# Patient Record
Sex: MALE | Race: WHITE | NOT HISPANIC OR LATINO | Employment: FULL TIME | URBAN - METROPOLITAN AREA
[De-identification: names, ages, dates, MRNs, and addresses within clinical notes are randomized per-mention and may not be internally consistent; named-entity substitution may affect disease eponyms.]

---

## 2017-10-31 ENCOUNTER — RECORDS - HEALTHEAST (OUTPATIENT)
Dept: LAB | Facility: CLINIC | Age: 52
End: 2017-10-31

## 2017-10-31 LAB
CHOLEST SERPL-MCNC: 188 MG/DL
FASTING STATUS PATIENT QL REPORTED: YES
HDLC SERPL-MCNC: 38 MG/DL
LDLC SERPL CALC-MCNC: 117 MG/DL
TRIGL SERPL-MCNC: 164 MG/DL

## 2017-12-29 ENCOUNTER — RECORDS - HEALTHEAST (OUTPATIENT)
Dept: ADMINISTRATIVE | Facility: OTHER | Age: 52
End: 2017-12-29

## 2018-02-02 ENCOUNTER — RECORDS - HEALTHEAST (OUTPATIENT)
Dept: LAB | Facility: CLINIC | Age: 53
End: 2018-02-02

## 2018-02-02 LAB
ANION GAP SERPL CALCULATED.3IONS-SCNC: 10 MMOL/L (ref 5–18)
BUN SERPL-MCNC: 13 MG/DL (ref 8–22)
CALCIUM SERPL-MCNC: 9.3 MG/DL (ref 8.5–10.5)
CHLORIDE BLD-SCNC: 108 MMOL/L (ref 98–107)
CO2 SERPL-SCNC: 22 MMOL/L (ref 22–31)
CREAT SERPL-MCNC: 0.84 MG/DL (ref 0.7–1.3)
GFR SERPL CREATININE-BSD FRML MDRD: >60 ML/MIN/1.73M2
GLUCOSE BLD-MCNC: 92 MG/DL (ref 70–125)
POTASSIUM BLD-SCNC: 4.3 MMOL/L (ref 3.5–5)
SODIUM SERPL-SCNC: 140 MMOL/L (ref 136–145)

## 2018-02-15 ASSESSMENT — MIFFLIN-ST. JEOR: SCORE: 1951.63

## 2018-02-21 ENCOUNTER — SURGERY - HEALTHEAST (OUTPATIENT)
Dept: SURGERY | Facility: CLINIC | Age: 53
End: 2018-02-21

## 2018-02-21 ENCOUNTER — ANESTHESIA - HEALTHEAST (OUTPATIENT)
Dept: SURGERY | Facility: CLINIC | Age: 53
End: 2018-02-21

## 2018-02-21 ASSESSMENT — MIFFLIN-ST. JEOR: SCORE: 1976.86

## 2018-03-02 ENCOUNTER — RECORDS - HEALTHEAST (OUTPATIENT)
Dept: ADMINISTRATIVE | Facility: OTHER | Age: 53
End: 2018-03-02

## 2018-03-30 ENCOUNTER — RECORDS - HEALTHEAST (OUTPATIENT)
Dept: ADMINISTRATIVE | Facility: OTHER | Age: 53
End: 2018-03-30

## 2018-04-27 ENCOUNTER — RECORDS - HEALTHEAST (OUTPATIENT)
Dept: ADMINISTRATIVE | Facility: OTHER | Age: 53
End: 2018-04-27

## 2018-07-20 ENCOUNTER — RECORDS - HEALTHEAST (OUTPATIENT)
Dept: ADMINISTRATIVE | Facility: OTHER | Age: 53
End: 2018-07-20

## 2018-08-16 ENCOUNTER — RECORDS - HEALTHEAST (OUTPATIENT)
Dept: LAB | Facility: CLINIC | Age: 53
End: 2018-08-16

## 2018-08-16 LAB
ALBUMIN SERPL-MCNC: 4.2 G/DL (ref 3.5–5)
ALP SERPL-CCNC: 62 U/L (ref 45–120)
ALT SERPL W P-5'-P-CCNC: 39 U/L (ref 0–45)
ANION GAP SERPL CALCULATED.3IONS-SCNC: 11 MMOL/L (ref 5–18)
AST SERPL W P-5'-P-CCNC: 30 U/L (ref 0–40)
BILIRUB SERPL-MCNC: 0.9 MG/DL (ref 0–1)
BUN SERPL-MCNC: 19 MG/DL (ref 8–22)
CALCIUM SERPL-MCNC: 9.7 MG/DL (ref 8.5–10.5)
CHLORIDE BLD-SCNC: 107 MMOL/L (ref 98–107)
CO2 SERPL-SCNC: 24 MMOL/L (ref 22–31)
CREAT SERPL-MCNC: 0.93 MG/DL (ref 0.7–1.3)
GFR SERPL CREATININE-BSD FRML MDRD: >60 ML/MIN/1.73M2
GLUCOSE BLD-MCNC: 89 MG/DL (ref 70–125)
POTASSIUM BLD-SCNC: 4.5 MMOL/L (ref 3.5–5)
PROT SERPL-MCNC: 6.7 G/DL (ref 6–8)
SODIUM SERPL-SCNC: 142 MMOL/L (ref 136–145)

## 2018-12-26 ENCOUNTER — RECORDS - HEALTHEAST (OUTPATIENT)
Dept: LAB | Facility: CLINIC | Age: 53
End: 2018-12-26

## 2018-12-26 LAB
ALBUMIN SERPL-MCNC: 3.9 G/DL (ref 3.5–5)
ALP SERPL-CCNC: 71 U/L (ref 45–120)
ALT SERPL W P-5'-P-CCNC: 34 U/L (ref 0–45)
ANION GAP SERPL CALCULATED.3IONS-SCNC: 8 MMOL/L (ref 5–18)
AST SERPL W P-5'-P-CCNC: 22 U/L (ref 0–40)
BILIRUB SERPL-MCNC: 0.7 MG/DL (ref 0–1)
BUN SERPL-MCNC: 18 MG/DL (ref 8–22)
CALCIUM SERPL-MCNC: 9.5 MG/DL (ref 8.5–10.5)
CHLORIDE BLD-SCNC: 106 MMOL/L (ref 98–107)
CHOLEST SERPL-MCNC: 194 MG/DL
CO2 SERPL-SCNC: 26 MMOL/L (ref 22–31)
CREAT SERPL-MCNC: 0.83 MG/DL (ref 0.7–1.3)
FASTING STATUS PATIENT QL REPORTED: YES
GFR SERPL CREATININE-BSD FRML MDRD: >60 ML/MIN/1.73M2
GLUCOSE BLD-MCNC: 100 MG/DL (ref 70–125)
HDLC SERPL-MCNC: 41 MG/DL
LDLC SERPL CALC-MCNC: 88 MG/DL
POTASSIUM BLD-SCNC: 4.1 MMOL/L (ref 3.5–5)
PROT SERPL-MCNC: 6.6 G/DL (ref 6–8)
SODIUM SERPL-SCNC: 140 MMOL/L (ref 136–145)
TRIGL SERPL-MCNC: 327 MG/DL

## 2019-05-31 ENCOUNTER — RECORDS - HEALTHEAST (OUTPATIENT)
Dept: LAB | Facility: CLINIC | Age: 54
End: 2019-05-31

## 2019-06-02 LAB — BACTERIA SPEC CULT: NORMAL

## 2020-01-13 ENCOUNTER — RECORDS - HEALTHEAST (OUTPATIENT)
Dept: LAB | Facility: CLINIC | Age: 55
End: 2020-01-13

## 2020-01-13 LAB
ALBUMIN SERPL-MCNC: 4.1 G/DL (ref 3.5–5)
ALP SERPL-CCNC: 89 U/L (ref 45–120)
ALT SERPL W P-5'-P-CCNC: 44 U/L (ref 0–45)
ANION GAP SERPL CALCULATED.3IONS-SCNC: 10 MMOL/L (ref 5–18)
AST SERPL W P-5'-P-CCNC: 32 U/L (ref 0–40)
BILIRUB SERPL-MCNC: 0.3 MG/DL (ref 0–1)
BUN SERPL-MCNC: 15 MG/DL (ref 8–22)
CALCIUM SERPL-MCNC: 9.7 MG/DL (ref 8.5–10.5)
CHLORIDE BLD-SCNC: 107 MMOL/L (ref 98–107)
CHOLEST SERPL-MCNC: 198 MG/DL
CO2 SERPL-SCNC: 23 MMOL/L (ref 22–31)
CREAT SERPL-MCNC: 0.9 MG/DL (ref 0.7–1.3)
FASTING STATUS PATIENT QL REPORTED: NO
GFR SERPL CREATININE-BSD FRML MDRD: >60 ML/MIN/1.73M2
GLUCOSE BLD-MCNC: 85 MG/DL (ref 70–125)
HDLC SERPL-MCNC: 34 MG/DL
LDLC SERPL CALC-MCNC: 101 MG/DL
LDLC SERPL CALC-MCNC: ABNORMAL MG/DL
POTASSIUM BLD-SCNC: 4.5 MMOL/L (ref 3.5–5)
PROT SERPL-MCNC: 6.7 G/DL (ref 6–8)
SODIUM SERPL-SCNC: 140 MMOL/L (ref 136–145)
TRIGL SERPL-MCNC: 587 MG/DL

## 2020-06-10 ENCOUNTER — RECORDS - HEALTHEAST (OUTPATIENT)
Dept: LAB | Facility: CLINIC | Age: 55
End: 2020-06-10

## 2020-06-10 LAB
ALT SERPL W P-5'-P-CCNC: 40 U/L (ref 0–45)
CHOLEST SERPL-MCNC: 160 MG/DL
FASTING STATUS PATIENT QL REPORTED: NO
HDLC SERPL-MCNC: 34 MG/DL
LDLC SERPL CALC-MCNC: 70 MG/DL
LDLC SERPL CALC-MCNC: ABNORMAL MG/DL
TRIGL SERPL-MCNC: 510 MG/DL

## 2020-06-12 LAB — TESTOST SERPL-MCNC: 295 NG/DL (ref 221–716)

## 2020-12-10 ENCOUNTER — RECORDS - HEALTHEAST (OUTPATIENT)
Dept: LAB | Facility: CLINIC | Age: 55
End: 2020-12-10

## 2020-12-10 LAB
ALBUMIN SERPL-MCNC: 4.1 G/DL (ref 3.5–5)
ALP SERPL-CCNC: 72 U/L (ref 45–120)
ALT SERPL W P-5'-P-CCNC: 58 U/L (ref 0–45)
ANION GAP SERPL CALCULATED.3IONS-SCNC: 7 MMOL/L (ref 5–18)
AST SERPL W P-5'-P-CCNC: 45 U/L (ref 0–40)
BILIRUB SERPL-MCNC: 0.8 MG/DL (ref 0–1)
BUN SERPL-MCNC: 16 MG/DL (ref 8–22)
CALCIUM SERPL-MCNC: 9.3 MG/DL (ref 8.5–10.5)
CHLORIDE BLD-SCNC: 106 MMOL/L (ref 98–107)
CO2 SERPL-SCNC: 27 MMOL/L (ref 22–31)
CREAT SERPL-MCNC: 1.07 MG/DL (ref 0.7–1.3)
GFR SERPL CREATININE-BSD FRML MDRD: >60 ML/MIN/1.73M2
GLUCOSE BLD-MCNC: 105 MG/DL (ref 70–125)
POTASSIUM BLD-SCNC: 4.7 MMOL/L (ref 3.5–5)
PROT SERPL-MCNC: 6.6 G/DL (ref 6–8)
SODIUM SERPL-SCNC: 140 MMOL/L (ref 136–145)

## 2021-03-26 ENCOUNTER — RECORDS - HEALTHEAST (OUTPATIENT)
Dept: LAB | Facility: CLINIC | Age: 56
End: 2021-03-26

## 2021-03-26 LAB
CHOLEST SERPL-MCNC: 166 MG/DL
FASTING STATUS PATIENT QL REPORTED: YES
HDLC SERPL-MCNC: 37 MG/DL
LDLC SERPL CALC-MCNC: 92 MG/DL
TRIGL SERPL-MCNC: 184 MG/DL

## 2021-06-01 VITALS — HEIGHT: 72 IN | BODY MASS INDEX: 33.26 KG/M2 | WEIGHT: 245.56 LBS

## 2021-06-16 NOTE — ANESTHESIA CARE TRANSFER NOTE
Last vitals:   Vitals:    02/21/18 1410   BP: (!) 150/93   Pulse: 77   Resp: 15   Temp:    SpO2: 100%     Patient's level of consciousness is drowsy  Spontaneous respirations: yes  Maintains airway independently: yes  Dentition unchanged: yes  Oropharynx: oropharynx clear of all foreign objects    QCDR Measures:  ASA# 20 - Surgical Safety Checklist: WHO surgical safety checklist completed prior to induction  PQRS# 430 - Adult PONV Prevention: 4558F - Pt received => 2 anti-emetic agents (different classes) preop & intraop  ASA# 8 - Peds PONV Prevention: NA - Not pediatric patient, not GA or 2 or more risk factors NOT present  PQRS# 424 - [place]-op Temp Management: 4559F - At least one body temp DOCUMENTED => 35.5C or 95.9F within required timeframe  PQRS# 426 - PACU Transfer Protocol: - Transfer of care checklist used  ASA# 14 - Acute Post-op Pain: ASA14B - Patient did NOT experience pain >= 7 out of 10

## 2021-06-16 NOTE — ANESTHESIA PREPROCEDURE EVALUATION
Anesthesia Evaluation      Patient summary reviewed   No history of anesthetic complications     Airway   Mallampati: I  Neck ROM: full   Pulmonary - negative ROS and normal exam                          Cardiovascular - negative ROS and normal exam  (+) hypertension well controlled, , hypercholesterolemia,      Neuro/Psych - negative ROS     Endo/Other - negative ROS      GI/Hepatic/Renal - negative ROS           Dental - normal exam                        Anesthesia Plan  Planned anesthetic: general LMA and peripheral nerve block  Peripheral nerve block for post-op analgesia as ordered by surgeon.  ASA 2   Induction: intravenous   Anesthetic plan and risks discussed with: patient and spouse    Post-op plan: routine recovery

## 2021-06-16 NOTE — ANESTHESIA PROCEDURE NOTES
Peripheral Block    Patient location during procedure: pre-op  Start time: 2/21/2018 11:55 AM  End time: 2/21/2018 11:58 AM  post-op analgesia per surgeon order as noted in medical record  Staffing:  Performing  Anesthesiologist: CATHERINE PUGH  Preanesthetic Checklist  Completed: patient identified, site marked, risks, benefits, and alternatives discussed, timeout performed, consent obtained, airway assessed, oxygen available, suction available, emergency drugs available and hand hygiene performed  Peripheral Block  Block type: sciatic, popliteal  Prep: ChloraPrep  Patient position: supine  Patient monitoring: blood pressure, heart rate, continuous pulse oximetry and cardiac monitor  Laterality: right  Injection technique: ultrasound guided    Ultrasound used to visualize needle placement in proximity to nerve being blocked: yes   Permanent ultrasound image captured for medical record  Sterile gel and probe cover used for ultrasound.    Needle  Needle type: Stimuplex   Needle gauge: 22 G  Needle length: 4 in  no peripheral nerve catheter placed  Assessment  Injection assessment: negative aspiration for heme, no paresthesia on injection, incremental injection and no difficulty with injection  Additional Notes  30ml 0.5% bupivacaine w/ epi 1:200K injected for nerve block.

## 2021-06-16 NOTE — ANESTHESIA POSTPROCEDURE EVALUATION
Patient: Richard Tracy  #1 RIGHT ANKLE ARTHROSCOPY EVALUATION AND DEBRIDEMENT, OSTEOCHONDRAL DEFECT REPAIR, LATERAL LIGAMENT REPAIR  Anesthesia type: general    Patient location: PACU  Last vitals:   Vitals:    02/21/18 1430   BP: 123/90   Pulse: 70   Resp: 16   Temp: 36.9  C (98.4  F)   SpO2: 99%     Post vital signs: stable  Level of consciousness: awake and responds to simple questions  Post-anesthesia pain: pain controlled  Post-anesthesia nausea and vomiting: no  Pulmonary: unassisted, return to baseline  Cardiovascular: stable and blood pressure at baseline  Hydration: adequate  Anesthetic events: no    QCDR Measures:  ASA# 11 - [place]-op Cardiac Arrest: ASA11B - Patient did NOT experience unanticipated cardiac arrest  ASA# 12 - [place]-op Mortality Rate: ASA12B - Patient did NOT die  ASA# 13 - PACU Re-Intubation Rate: ASA13B - Patient did NOT require a new airway mgmt  ASA# 10 - Composite Anes Safety: ASA10A - No serious adverse event    Additional Notes:

## 2021-06-16 NOTE — ANESTHESIA PROCEDURE NOTES
Peripheral Block    Patient location during procedure: pre-op  Start time: 2/21/2018 11:52 AM  End time: 2/21/2018 11:55 AM  post-op analgesia per surgeon order as noted in medical record  Staffing:  Performing  Anesthesiologist: CATHERINE PUGH  Preanesthetic Checklist  Completed: patient identified, site marked, risks, benefits, and alternatives discussed, timeout performed, consent obtained, airway assessed, oxygen available, suction available, emergency drugs available and hand hygiene performed  Peripheral Block  Block type: saphenous, adductor canal block  Prep: ChloraPrep  Patient position: supine  Patient monitoring: blood pressure, heart rate, continuous pulse oximetry and cardiac monitor  Laterality: right  Injection technique: ultrasound guided    Ultrasound used to visualize needle placement in proximity to nerve being blocked: yes   Permanent ultrasound image captured for medical record  Sterile gel and probe cover used for ultrasound.    Needle  Needle type: Stimuplex   Needle gauge: 20G  Needle length: 4 in  no peripheral nerve catheter placed  Assessment  Injection assessment: negative aspiration for heme, no paresthesia on injection, incremental injection and no difficulty with injection  Additional Notes  10ml 0.5% bupivacaine w/ epi 1:200K injected for nerve block.

## 2022-04-07 ENCOUNTER — LAB REQUISITION (OUTPATIENT)
Dept: LAB | Facility: CLINIC | Age: 57
End: 2022-04-07

## 2022-04-07 DIAGNOSIS — Z12.5 ENCOUNTER FOR SCREENING FOR MALIGNANT NEOPLASM OF PROSTATE: ICD-10-CM

## 2022-04-07 DIAGNOSIS — E78.5 HYPERLIPIDEMIA, UNSPECIFIED: ICD-10-CM

## 2022-04-07 DIAGNOSIS — I10 ESSENTIAL (PRIMARY) HYPERTENSION: ICD-10-CM

## 2022-04-07 LAB
ALBUMIN SERPL-MCNC: 4 G/DL (ref 3.5–5)
ALP SERPL-CCNC: 93 U/L (ref 45–120)
ALT SERPL W P-5'-P-CCNC: 69 U/L (ref 0–45)
ANION GAP SERPL CALCULATED.3IONS-SCNC: 12 MMOL/L (ref 5–18)
AST SERPL W P-5'-P-CCNC: 54 U/L (ref 0–40)
BILIRUB SERPL-MCNC: 0.5 MG/DL (ref 0–1)
BUN SERPL-MCNC: 18 MG/DL (ref 8–22)
CALCIUM SERPL-MCNC: 10.1 MG/DL (ref 8.5–10.5)
CHLORIDE BLD-SCNC: 107 MMOL/L (ref 98–107)
CHOLEST SERPL-MCNC: 155 MG/DL
CO2 SERPL-SCNC: 24 MMOL/L (ref 22–31)
CREAT SERPL-MCNC: 0.94 MG/DL (ref 0.7–1.3)
FASTING STATUS PATIENT QL REPORTED: ABNORMAL
GFR SERPL CREATININE-BSD FRML MDRD: >90 ML/MIN/1.73M2
GLUCOSE BLD-MCNC: 113 MG/DL (ref 70–125)
HDLC SERPL-MCNC: 31 MG/DL
LDLC SERPL CALC-MCNC: 53 MG/DL
POTASSIUM BLD-SCNC: 4 MMOL/L (ref 3.5–5)
PROT SERPL-MCNC: 6.7 G/DL (ref 6–8)
PSA SERPL-MCNC: 0.5 UG/L (ref 0–3.5)
SODIUM SERPL-SCNC: 143 MMOL/L (ref 136–145)
TRIGL SERPL-MCNC: 356 MG/DL

## 2022-04-07 PROCEDURE — G0103 PSA SCREENING: HCPCS | Performed by: STUDENT IN AN ORGANIZED HEALTH CARE EDUCATION/TRAINING PROGRAM

## 2022-04-07 PROCEDURE — 80061 LIPID PANEL: CPT | Performed by: STUDENT IN AN ORGANIZED HEALTH CARE EDUCATION/TRAINING PROGRAM

## 2022-04-07 PROCEDURE — 80053 COMPREHEN METABOLIC PANEL: CPT | Performed by: STUDENT IN AN ORGANIZED HEALTH CARE EDUCATION/TRAINING PROGRAM

## 2023-04-05 ENCOUNTER — LAB REQUISITION (OUTPATIENT)
Dept: LAB | Facility: CLINIC | Age: 58
End: 2023-04-05

## 2023-04-05 DIAGNOSIS — Z12.5 ENCOUNTER FOR SCREENING FOR MALIGNANT NEOPLASM OF PROSTATE: ICD-10-CM

## 2023-04-05 DIAGNOSIS — E78.5 HYPERLIPIDEMIA, UNSPECIFIED: ICD-10-CM

## 2023-04-05 DIAGNOSIS — I10 ESSENTIAL (PRIMARY) HYPERTENSION: ICD-10-CM

## 2023-04-05 LAB
ALBUMIN SERPL BCG-MCNC: 4.5 G/DL (ref 3.5–5.2)
ALP SERPL-CCNC: 65 U/L (ref 40–129)
ALT SERPL W P-5'-P-CCNC: 73 U/L (ref 10–50)
ANION GAP SERPL CALCULATED.3IONS-SCNC: 12 MMOL/L (ref 7–15)
AST SERPL W P-5'-P-CCNC: 55 U/L (ref 10–50)
BILIRUB SERPL-MCNC: 0.6 MG/DL
BUN SERPL-MCNC: 16.3 MG/DL (ref 6–20)
CALCIUM SERPL-MCNC: 9.7 MG/DL (ref 8.6–10)
CHLORIDE SERPL-SCNC: 103 MMOL/L (ref 98–107)
CHOLEST SERPL-MCNC: 173 MG/DL
CREAT SERPL-MCNC: 1.03 MG/DL (ref 0.67–1.17)
DEPRECATED HCO3 PLAS-SCNC: 26 MMOL/L (ref 22–29)
GFR SERPL CREATININE-BSD FRML MDRD: 84 ML/MIN/1.73M2
GLUCOSE SERPL-MCNC: 91 MG/DL (ref 70–99)
HDLC SERPL-MCNC: 31 MG/DL
LDLC SERPL CALC-MCNC: ABNORMAL MG/DL
LDLC SERPL DIRECT ASSAY-MCNC: 67 MG/DL
NONHDLC SERPL-MCNC: 142 MG/DL
POTASSIUM SERPL-SCNC: 4.4 MMOL/L (ref 3.4–5.3)
PROT SERPL-MCNC: 6.7 G/DL (ref 6.4–8.3)
PSA SERPL DL<=0.01 NG/ML-MCNC: 0.55 NG/ML (ref 0–3.5)
SODIUM SERPL-SCNC: 141 MMOL/L (ref 136–145)
TRIGL SERPL-MCNC: 542 MG/DL

## 2023-04-05 PROCEDURE — G0103 PSA SCREENING: HCPCS | Performed by: STUDENT IN AN ORGANIZED HEALTH CARE EDUCATION/TRAINING PROGRAM

## 2023-04-05 PROCEDURE — 80061 LIPID PANEL: CPT | Performed by: STUDENT IN AN ORGANIZED HEALTH CARE EDUCATION/TRAINING PROGRAM

## 2023-04-05 PROCEDURE — 80053 COMPREHEN METABOLIC PANEL: CPT | Performed by: STUDENT IN AN ORGANIZED HEALTH CARE EDUCATION/TRAINING PROGRAM

## 2023-04-05 PROCEDURE — 83721 ASSAY OF BLOOD LIPOPROTEIN: CPT | Performed by: STUDENT IN AN ORGANIZED HEALTH CARE EDUCATION/TRAINING PROGRAM

## 2023-08-31 ENCOUNTER — TRANSFERRED RECORDS (OUTPATIENT)
Dept: HEALTH INFORMATION MANAGEMENT | Facility: CLINIC | Age: 58
End: 2023-08-31

## 2023-11-20 ENCOUNTER — LAB REQUISITION (OUTPATIENT)
Dept: LAB | Facility: CLINIC | Age: 58
End: 2023-11-20

## 2023-11-20 DIAGNOSIS — R74.8 ABNORMAL LEVELS OF OTHER SERUM ENZYMES: ICD-10-CM

## 2023-11-20 LAB
ALBUMIN SERPL BCG-MCNC: 4.2 G/DL (ref 3.5–5.2)
ALP SERPL-CCNC: 63 U/L (ref 40–150)
ALT SERPL W P-5'-P-CCNC: 46 U/L (ref 0–70)
ANION GAP SERPL CALCULATED.3IONS-SCNC: 11 MMOL/L (ref 7–15)
AST SERPL W P-5'-P-CCNC: 37 U/L (ref 0–45)
BILIRUB SERPL-MCNC: 0.8 MG/DL
BUN SERPL-MCNC: 11.3 MG/DL (ref 8–23)
CALCIUM SERPL-MCNC: 9.3 MG/DL (ref 8.6–10)
CHLORIDE SERPL-SCNC: 107 MMOL/L (ref 98–107)
CREAT SERPL-MCNC: 0.94 MG/DL (ref 0.67–1.17)
DEPRECATED HCO3 PLAS-SCNC: 25 MMOL/L (ref 22–29)
EGFRCR SERPLBLD CKD-EPI 2021: >90 ML/MIN/1.73M2
GLUCOSE SERPL-MCNC: 121 MG/DL (ref 70–99)
POTASSIUM SERPL-SCNC: 4.1 MMOL/L (ref 3.4–5.3)
PROT SERPL-MCNC: 6.3 G/DL (ref 6.4–8.3)
SODIUM SERPL-SCNC: 143 MMOL/L (ref 135–145)

## 2023-11-20 PROCEDURE — 80053 COMPREHEN METABOLIC PANEL: CPT | Performed by: STUDENT IN AN ORGANIZED HEALTH CARE EDUCATION/TRAINING PROGRAM

## 2024-04-22 ENCOUNTER — LAB REQUISITION (OUTPATIENT)
Dept: LAB | Facility: CLINIC | Age: 59
End: 2024-04-22

## 2024-04-22 DIAGNOSIS — R06.09 OTHER FORMS OF DYSPNEA: ICD-10-CM

## 2024-04-22 PROCEDURE — 82374 ASSAY BLOOD CARBON DIOXIDE: CPT | Performed by: PHYSICIAN ASSISTANT

## 2024-04-22 PROCEDURE — 84443 ASSAY THYROID STIM HORMONE: CPT | Performed by: PHYSICIAN ASSISTANT

## 2024-04-22 PROCEDURE — 82565 ASSAY OF CREATININE: CPT | Performed by: PHYSICIAN ASSISTANT

## 2024-04-23 LAB
ANION GAP SERPL CALCULATED.3IONS-SCNC: 13 MMOL/L (ref 7–15)
BUN SERPL-MCNC: 13.8 MG/DL (ref 8–23)
CALCIUM SERPL-MCNC: 9.5 MG/DL (ref 8.6–10)
CHLORIDE SERPL-SCNC: 105 MMOL/L (ref 98–107)
CREAT SERPL-MCNC: 1.08 MG/DL (ref 0.67–1.17)
DEPRECATED HCO3 PLAS-SCNC: 23 MMOL/L (ref 22–29)
EGFRCR SERPLBLD CKD-EPI 2021: 79 ML/MIN/1.73M2
GLUCOSE SERPL-MCNC: 144 MG/DL (ref 70–99)
POTASSIUM SERPL-SCNC: 3.8 MMOL/L (ref 3.4–5.3)
SODIUM SERPL-SCNC: 141 MMOL/L (ref 135–145)
TSH SERPL DL<=0.005 MIU/L-ACNC: 2.29 UIU/ML (ref 0.3–4.2)

## 2024-05-01 ENCOUNTER — HOSPITAL ENCOUNTER (OUTPATIENT)
Dept: CARDIOLOGY | Facility: CLINIC | Age: 59
Discharge: HOME | End: 2024-05-01
Attending: PHYSICIAN ASSISTANT | Admitting: PHYSICIAN ASSISTANT
Payer: COMMERCIAL

## 2024-05-01 DIAGNOSIS — R06.09 DYSPNEA ON EXERTION: ICD-10-CM

## 2024-05-01 PROCEDURE — 93306 TTE W/DOPPLER COMPLETE: CPT | Mod: 26 | Performed by: INTERNAL MEDICINE

## 2024-05-01 PROCEDURE — 255N000002 HC RX 255 OP 636: Performed by: INTERNAL MEDICINE

## 2024-05-01 PROCEDURE — C8929 TTE W OR WO FOL WCON,DOPPLER: HCPCS

## 2024-05-01 RX ADMIN — PERFLUTREN 3 ML: 6.52 INJECTION, SUSPENSION INTRAVENOUS at 09:17

## 2024-05-07 ENCOUNTER — LAB REQUISITION (OUTPATIENT)
Dept: LAB | Facility: CLINIC | Age: 59
End: 2024-05-07

## 2024-05-07 DIAGNOSIS — R73.9 HYPERGLYCEMIA, UNSPECIFIED: ICD-10-CM

## 2024-05-07 LAB — HBA1C MFR BLD: 6.6 %

## 2024-05-07 PROCEDURE — 83036 HEMOGLOBIN GLYCOSYLATED A1C: CPT | Performed by: PHYSICIAN ASSISTANT

## 2024-05-07 PROCEDURE — 80048 BASIC METABOLIC PNL TOTAL CA: CPT | Performed by: PHYSICIAN ASSISTANT

## 2024-05-08 LAB
ANION GAP SERPL CALCULATED.3IONS-SCNC: 13 MMOL/L (ref 7–15)
BUN SERPL-MCNC: 20.8 MG/DL (ref 8–23)
CALCIUM SERPL-MCNC: 9.6 MG/DL (ref 8.8–10.2)
CHLORIDE SERPL-SCNC: 105 MMOL/L (ref 98–107)
CREAT SERPL-MCNC: 1.03 MG/DL (ref 0.67–1.17)
DEPRECATED HCO3 PLAS-SCNC: 25 MMOL/L (ref 22–29)
EGFRCR SERPLBLD CKD-EPI 2021: 83 ML/MIN/1.73M2
GLUCOSE SERPL-MCNC: 123 MG/DL (ref 70–99)
POTASSIUM SERPL-SCNC: 3.8 MMOL/L (ref 3.4–5.3)
SODIUM SERPL-SCNC: 143 MMOL/L (ref 135–145)

## 2024-05-18 ENCOUNTER — HEALTH MAINTENANCE LETTER (OUTPATIENT)
Age: 59
End: 2024-05-18

## 2024-05-20 ENCOUNTER — LAB REQUISITION (OUTPATIENT)
Dept: LAB | Facility: CLINIC | Age: 59
End: 2024-05-20

## 2024-05-20 DIAGNOSIS — Z12.5 ENCOUNTER FOR SCREENING FOR MALIGNANT NEOPLASM OF PROSTATE: ICD-10-CM

## 2024-05-20 DIAGNOSIS — E78.5 HYPERLIPIDEMIA, UNSPECIFIED: ICD-10-CM

## 2024-05-20 LAB
CHOLEST SERPL-MCNC: 145 MG/DL
FASTING STATUS PATIENT QL REPORTED: ABNORMAL
HDLC SERPL-MCNC: 32 MG/DL
LDLC SERPL CALC-MCNC: 66 MG/DL
NONHDLC SERPL-MCNC: 113 MG/DL
PSA SERPL DL<=0.01 NG/ML-MCNC: 0.59 NG/ML (ref 0–4.5)
TRIGL SERPL-MCNC: 237 MG/DL

## 2024-05-20 PROCEDURE — 80061 LIPID PANEL: CPT | Performed by: STUDENT IN AN ORGANIZED HEALTH CARE EDUCATION/TRAINING PROGRAM

## 2024-05-20 PROCEDURE — G0103 PSA SCREENING: HCPCS | Performed by: STUDENT IN AN ORGANIZED HEALTH CARE EDUCATION/TRAINING PROGRAM

## 2024-12-02 ENCOUNTER — LAB REQUISITION (OUTPATIENT)
Dept: LAB | Facility: CLINIC | Age: 59
End: 2024-12-02

## 2024-12-02 DIAGNOSIS — I10 ESSENTIAL (PRIMARY) HYPERTENSION: ICD-10-CM

## 2024-12-02 PROCEDURE — 82040 ASSAY OF SERUM ALBUMIN: CPT

## 2024-12-03 LAB
ALBUMIN SERPL BCG-MCNC: 4.3 G/DL (ref 3.5–5.2)
ALP SERPL-CCNC: 77 U/L (ref 40–150)
ALT SERPL W P-5'-P-CCNC: 81 U/L (ref 0–70)
ANION GAP SERPL CALCULATED.3IONS-SCNC: 12 MMOL/L (ref 7–15)
AST SERPL W P-5'-P-CCNC: 61 U/L (ref 0–45)
BILIRUB SERPL-MCNC: 0.4 MG/DL
BUN SERPL-MCNC: 23.2 MG/DL (ref 8–23)
CALCIUM SERPL-MCNC: 9.8 MG/DL (ref 8.8–10.4)
CHLORIDE SERPL-SCNC: 104 MMOL/L (ref 98–107)
CREAT SERPL-MCNC: 1.05 MG/DL (ref 0.67–1.17)
EGFRCR SERPLBLD CKD-EPI 2021: 81 ML/MIN/1.73M2
GLUCOSE SERPL-MCNC: 137 MG/DL (ref 70–99)
HCO3 SERPL-SCNC: 25 MMOL/L (ref 22–29)
POTASSIUM SERPL-SCNC: 3.8 MMOL/L (ref 3.4–5.3)
PROT SERPL-MCNC: 6.8 G/DL (ref 6.4–8.3)
SODIUM SERPL-SCNC: 141 MMOL/L (ref 135–145)

## 2025-02-06 ENCOUNTER — LAB REQUISITION (OUTPATIENT)
Dept: LAB | Facility: CLINIC | Age: 60
End: 2025-02-06

## 2025-02-06 DIAGNOSIS — K76.0 FATTY (CHANGE OF) LIVER, NOT ELSEWHERE CLASSIFIED: ICD-10-CM

## 2025-02-06 LAB
ALBUMIN SERPL BCG-MCNC: 4.1 G/DL (ref 3.5–5.2)
ALP SERPL-CCNC: 79 U/L (ref 40–150)
ALT SERPL W P-5'-P-CCNC: 78 U/L (ref 0–70)
ANION GAP SERPL CALCULATED.3IONS-SCNC: 14 MMOL/L (ref 7–15)
AST SERPL W P-5'-P-CCNC: 64 U/L (ref 0–45)
BILIRUB SERPL-MCNC: 0.5 MG/DL
BUN SERPL-MCNC: 17.9 MG/DL (ref 8–23)
CALCIUM SERPL-MCNC: 9.1 MG/DL (ref 8.8–10.4)
CHLORIDE SERPL-SCNC: 104 MMOL/L (ref 98–107)
CREAT SERPL-MCNC: 0.98 MG/DL (ref 0.67–1.17)
EGFRCR SERPLBLD CKD-EPI 2021: 88 ML/MIN/1.73M2
GLUCOSE SERPL-MCNC: 210 MG/DL (ref 70–99)
HCO3 SERPL-SCNC: 21 MMOL/L (ref 22–29)
POTASSIUM SERPL-SCNC: 3.8 MMOL/L (ref 3.4–5.3)
PROT SERPL-MCNC: 6.8 G/DL (ref 6.4–8.3)
SODIUM SERPL-SCNC: 139 MMOL/L (ref 135–145)

## 2025-02-06 PROCEDURE — 80053 COMPREHEN METABOLIC PANEL: CPT | Performed by: FAMILY MEDICINE

## 2025-03-17 ENCOUNTER — LAB REQUISITION (OUTPATIENT)
Dept: LAB | Facility: CLINIC | Age: 60
End: 2025-03-17

## 2025-03-17 DIAGNOSIS — E11.65 TYPE 2 DIABETES MELLITUS WITH HYPERGLYCEMIA (H): ICD-10-CM

## 2025-03-17 PROCEDURE — 82570 ASSAY OF URINE CREATININE: CPT | Performed by: FAMILY MEDICINE

## 2025-03-17 PROCEDURE — 82310 ASSAY OF CALCIUM: CPT | Performed by: FAMILY MEDICINE

## 2025-03-17 PROCEDURE — 82565 ASSAY OF CREATININE: CPT | Performed by: FAMILY MEDICINE

## 2025-03-17 PROCEDURE — 82043 UR ALBUMIN QUANTITATIVE: CPT | Performed by: FAMILY MEDICINE

## 2025-03-18 LAB
ANION GAP SERPL CALCULATED.3IONS-SCNC: 15 MMOL/L (ref 7–15)
BUN SERPL-MCNC: 28.1 MG/DL (ref 8–23)
CALCIUM SERPL-MCNC: 10.6 MG/DL (ref 8.8–10.4)
CHLORIDE SERPL-SCNC: 94 MMOL/L (ref 98–107)
CREAT SERPL-MCNC: 1.45 MG/DL (ref 0.67–1.17)
CREAT UR-MCNC: 262 MG/DL
EGFRCR SERPLBLD CKD-EPI 2021: 55 ML/MIN/1.73M2
GLUCOSE SERPL-MCNC: 608 MG/DL (ref 70–99)
HCO3 SERPL-SCNC: 24 MMOL/L (ref 22–29)
MICROALBUMIN UR-MCNC: 278 MG/L
MICROALBUMIN/CREAT UR: 106.11 MG/G CR (ref 0–17)
POTASSIUM SERPL-SCNC: 4.2 MMOL/L (ref 3.4–5.3)
SODIUM SERPL-SCNC: 133 MMOL/L (ref 135–145)

## 2025-03-31 ENCOUNTER — OFFICE VISIT (OUTPATIENT)
Dept: PHARMACY | Facility: PHYSICIAN GROUP | Age: 60
End: 2025-03-31
Payer: COMMERCIAL

## 2025-03-31 VITALS — OXYGEN SATURATION: 95 % | SYSTOLIC BLOOD PRESSURE: 100 MMHG | HEART RATE: 78 BPM | DIASTOLIC BLOOD PRESSURE: 60 MMHG

## 2025-03-31 DIAGNOSIS — E11.9 TYPE 2 DIABETES MELLITUS WITHOUT COMPLICATION, WITH LONG-TERM CURRENT USE OF INSULIN (H): Primary | ICD-10-CM

## 2025-03-31 DIAGNOSIS — Z78.9 TAKES DIETARY SUPPLEMENTS: ICD-10-CM

## 2025-03-31 DIAGNOSIS — N40.0 BENIGN PROSTATIC HYPERPLASIA, UNSPECIFIED WHETHER LOWER URINARY TRACT SYMPTOMS PRESENT: ICD-10-CM

## 2025-03-31 DIAGNOSIS — Z79.4 TYPE 2 DIABETES MELLITUS WITHOUT COMPLICATION, WITH LONG-TERM CURRENT USE OF INSULIN (H): Primary | ICD-10-CM

## 2025-03-31 DIAGNOSIS — J30.2 SEASONAL ALLERGIC RHINITIS, UNSPECIFIED TRIGGER: ICD-10-CM

## 2025-03-31 DIAGNOSIS — I10 PRIMARY HYPERTENSION: ICD-10-CM

## 2025-03-31 DIAGNOSIS — E78.2 MIXED HYPERLIPIDEMIA: ICD-10-CM

## 2025-03-31 PROCEDURE — 3078F DIAST BP <80 MM HG: CPT | Performed by: PHARMACIST

## 2025-03-31 PROCEDURE — 99605 MTMS BY PHARM NP 15 MIN: CPT | Performed by: PHARMACIST

## 2025-03-31 PROCEDURE — 3074F SYST BP LT 130 MM HG: CPT | Performed by: PHARMACIST

## 2025-03-31 PROCEDURE — 99607 MTMS BY PHARM ADDL 15 MIN: CPT | Performed by: PHARMACIST

## 2025-03-31 RX ORDER — UBIDECARENONE 75 MG
100 CAPSULE ORAL DAILY
COMMUNITY

## 2025-03-31 RX ORDER — ROSUVASTATIN CALCIUM 20 MG/1
20 TABLET, COATED ORAL DAILY
COMMUNITY

## 2025-03-31 RX ORDER — ALBUTEROL SULFATE 90 UG/1
2 INHALANT RESPIRATORY (INHALATION) EVERY 6 HOURS PRN
COMMUNITY

## 2025-03-31 RX ORDER — GLUCOSAMINE HCL 500 MG
TABLET ORAL
COMMUNITY

## 2025-03-31 RX ORDER — AZELASTINE 1 MG/ML
1 SPRAY, METERED NASAL 2 TIMES DAILY
COMMUNITY

## 2025-03-31 RX ORDER — HYDROCHLOROTHIAZIDE 50 MG/1
50 TABLET ORAL DAILY
COMMUNITY

## 2025-03-31 RX ORDER — FLUTICASONE PROPIONATE 50 MCG
1 SPRAY, SUSPENSION (ML) NASAL DAILY
COMMUNITY

## 2025-03-31 RX ORDER — MULTIVIT WITH MINERALS/LUTEIN
1 TABLET ORAL DAILY
COMMUNITY

## 2025-03-31 RX ORDER — HYDROCHLOROTHIAZIDE 12.5 MG/1
CAPSULE ORAL
COMMUNITY

## 2025-03-31 RX ORDER — LOSARTAN POTASSIUM 100 MG/1
100 TABLET ORAL DAILY
COMMUNITY

## 2025-03-31 NOTE — PATIENT INSTRUCTIONS
"Recommendations from today's MTM visit:                                                         I will send a prescription for Freestyle Edilberto continuous glucose monitoring system to your pharmacy. We amber use this to check your blood sugars    Start Mounjaro 2.5 mg sample provided in clinic. I will send the next dose to your pharmacy, but do not start it until you have finished the 1-month sample     Increase Lantus to 16 units daily    You can use Biotene lozenges or mouth wash to help with dry mouth. You can get this from the dental section of your pharmacy.     Follow-up: Return in 2 weeks (on 4/14/2025) for Follow up, in person @ 9:00 AM.    It was great speaking with you today.  I value your experience and would be very thankful for your time in providing feedback in our clinic survey. In the next few days, you may receive an email or text message from [facility] [employer] with a link to a survey related to your  clinical pharmacist.\"     To schedule another MTM appointment, please call the clinic directly or you may call the MTM scheduling line at [phone].    My Clinical Pharmacist's contact information:                                                      Please feel free to contact me with any questions or concerns you have.      Ponce Thorpe, Michael, CE, BCACP  Medication Therapy Management Pharmacist     "

## 2025-03-31 NOTE — PROGRESS NOTES
Medication Therapy Management (MTM) Encounter    ASSESSMENT:                            Medication Adherence/Access: No issues identified.    Diabetes  Patient is not meeting A1c goal of < 7%. Patient may benefit from increasing insulin given his A1c and continued elevated levels. He may also benefit from a GLP-1. Appears Mounjaro will be covered on his insurance and since he has commercial insurance, he can use a copay card if too pricey.     Since patient is injecting insulin, he also qualifies for a CGM. Discussed at length.    For dry mouth, could consider using over-the-counter Biotene    Hypertension   Stable     Hyperlipidemia   Stable     Allergy   Stable     BPH  Stable    PLAN:                            I will send a prescription for Freestyle Edilberto continuous glucose monitoring system to your pharmacy. We amber use this to check your blood sugars    Start Mounjaro 2.5 mg sample provided in clinic. I will send the next dose to your pharmacy, but do not start it until you have finished the 1-month sample     Increase Lantus to 16 units daily    You can use Biotene lozenges or mouth wash to help with dry mouth. You can get this from the dental section of your pharmacy.       Follow-up: Return in 2 weeks (on 4/14/2025) for Follow up, in person @ 9:00 AM.    Ponce Thorpe, PharmD, CE, BCACP  Medication Therapy Management Pharmacist     SUBJECTIVE/OBJECTIVE:                          Richard Tracy is a 59 year old male seen for an initial visit. He was referred to me from Angel Corado MD .    Reason for visit: Diabetes consult.    Allergies/ADRs: Reviewed in chart  Past Medical History: Reviewed in chart  Tobacco: He reports that he has quit smoking. He has never used smokeless tobacco.  Alcohol: Maybe one drink per week, if that.     Medication Adherence/Access: no issues reported.    Diabetes   Lantus 12 units nightly  Metformin 500 mg - 2 tablets twice daily - has only been on for about two  weeks.  Aspirin 81mg daily  Patient is not experiencing side effects.  Also noted increased thirst and urination at night. Notably, he was sent in the immediate release tablet. Most prominent complaint was the dry mouth and urination at night. Stated all of this occurred before the metformin.   Current diabetes symptoms: polyuria and polydipsia. Also notes neuropathy in his feet.   Diet/Exercise: Mornings: waffle for breakfast with butter, at break, usually has yogurt or boiled egg. For lunch eats chicken and vegetables. Dinner is usually a protein with vegetables.      Blood sugar monitoring: Has only checked three times. 589, 522 after dinner and 359 this morning. He is interested in a continuous glucose monitor.   Eye exam in the last 12 months? Up to date  Foot exam: up to date  Last A1c: 12.3% (03/17/2025)    Hypertension   Hydrochlorothiazide 50 mg every morning  Losartan 100 mg daily  Metoprolol succinate 150 mg daily  Patient reports no current medication side effects  Patient does not self-monitor blood pressure.       Hyperlipidemia   Rosuvastatin 20 mg daily  Patient reports no significant myalgias or other side effects     Allergy   Albuterol HFA as needed  Flonase nasal spray as needed  Azelastine nasal spray as needed   Patient reports no current medication side effects.    Primary symptoms are nasal congestion, post-nasal drip, and runny nose.   Patient feels that current therapy is effective.      BPH  Terazosin 20 mg daily at bedtime  No issues reported    Supplements   Multivitamin daily  Vitamin D3 3000 units daily  Vitamin B-12 OTC daily  No reported issues at this time.           Today's Vitals: /60 (BP Location: Right arm)   Pulse 78   SpO2 95%   ----------------      I spent 50 minutes with this patient today. All changes were made via collaborative practice agreement with Angel Corado MD.     A summary of these recommendations was given to the patient.    Ponce Thorpe  PharmD, CE, BCACP  Medication Therapy Management Pharmacist          Medication Therapy Recommendations  Type 2 diabetes mellitus without complication, with long-term current use of insulin (H)   1 Current Medication: insulin glargine (LANTUS PEN) 100 UNIT/ML pen   Current Medication Sig: Inject 12 Units subcutaneously at bedtime.   Rationale: Dose too low - Dosage too low - Effectiveness   Recommendation: Increase Dose   Status: Accepted per CPA   Identified Date: 3/31/2025 Completed Date: 3/31/2025         2 Current Medication: insulin glargine (LANTUS PEN) 100 UNIT/ML pen   Current Medication Sig: Inject 12 Units subcutaneously at bedtime.   Rationale: Medication requires monitoring - Needs additional monitoring   Recommendation: Self-Monitoring - FreeStyle Edilberto 3 Plus Sensor Misc   Status: Accepted per CPA   Identified Date: 3/31/2025 Completed Date: 3/31/2025         3 Rationale: Synergistic therapy - Needs additional medication therapy - Indication   Recommendation: Start Medication - Mounjaro 2.5 MG/0.5ML Soaj   Status: Accepted per CPA   Identified Date: 3/31/2025 Completed Date: 3/31/2025

## 2025-04-06 ENCOUNTER — HEALTH MAINTENANCE LETTER (OUTPATIENT)
Age: 60
End: 2025-04-06

## 2025-04-14 ENCOUNTER — OFFICE VISIT (OUTPATIENT)
Dept: PHARMACY | Facility: PHYSICIAN GROUP | Age: 60
End: 2025-04-14
Payer: COMMERCIAL

## 2025-04-14 VITALS — DIASTOLIC BLOOD PRESSURE: 62 MMHG | SYSTOLIC BLOOD PRESSURE: 110 MMHG

## 2025-04-14 DIAGNOSIS — E11.9 TYPE 2 DIABETES MELLITUS WITHOUT COMPLICATION, WITH LONG-TERM CURRENT USE OF INSULIN (H): Primary | ICD-10-CM

## 2025-04-14 DIAGNOSIS — Z79.4 TYPE 2 DIABETES MELLITUS WITHOUT COMPLICATION, WITH LONG-TERM CURRENT USE OF INSULIN (H): Primary | ICD-10-CM

## 2025-04-14 PROCEDURE — 3074F SYST BP LT 130 MM HG: CPT | Performed by: PHARMACIST

## 2025-04-14 PROCEDURE — 99606 MTMS BY PHARM EST 15 MIN: CPT | Performed by: PHARMACIST

## 2025-04-14 PROCEDURE — 3078F DIAST BP <80 MM HG: CPT | Performed by: PHARMACIST

## 2025-04-14 PROCEDURE — 99607 MTMS BY PHARM ADDL 15 MIN: CPT | Performed by: PHARMACIST

## 2025-04-14 NOTE — PATIENT INSTRUCTIONS
"Recommendations from today's MTM visit:                                                         Increase Lantus to 20 units daily    Continue Mounjaro 2.5 mg until you have completed four doses, then increase to 5 mg weekly     Follow-up: Return in 5 weeks (on 5/20/2025) for Follow up, using a phone visit @ 8:00 AM.    It was great speaking with you today.  I value your experience and would be very thankful for your time in providing feedback in our clinic survey. In the next few days, you may receive an email or text message from [employer] with a link to a survey related to your  clinical pharmacist.\"     To schedule another MTM appointment, please call the clinic directly or you may call the MTM scheduling line at [phone].    My Clinical Pharmacist's contact information:                                                      Please feel free to contact me with any questions or concerns you have.      Ponce Thorpe, Michael, CE, BCACP  Medication Therapy Management Pharmacist     "

## 2025-04-14 NOTE — PROGRESS NOTES
Medication Therapy Management (MTM) Encounter    ASSESSMENT:                            Medication Adherence/Access: No issues identified.    Diabetes  Patient is not meeting A1c goal of < 7%. Still trending higher than 130 at night. Reasonable to increase Lantus dose and continue Mounjaro titration    PLAN:                            Increase Lantus to 20 units daily    Continue Mounjaro 2.5 mg until you have completed four doses, then increase to 5 mg weekly     Follow-up: Return in 5 weeks (on 5/20/2025) for Follow up, using a phone visit @ 8:00 AM.    Ponce Thorpe, PharmD, CE, BCACP  Medication Therapy Management Pharmacist     SUBJECTIVE/OBJECTIVE:                          Richard Tracy is a 59 year old male seen for a follow-up visit.       Reason for visit: Diabetes follow up.    Allergies/ADRs: Reviewed in chart  Past Medical History: Reviewed in chart  Tobacco: He reports that he has quit smoking. He has never used smokeless tobacco.  Alcohol: Less than 1 beverages / week    Medication Adherence/Access: no issues reported.    Diabetes   Lantus 16 units nightly  Mounjaro 2.5 mg weekly  - will be taking his third injection tonight.   Metformin 500 mg - 2 tablets twice daily   Aspirin 81mg daily  Patient is not experiencing side effects.  Also noted increased thirst and urination at night. Notably, he was sent in the immediate release tablet. Most prominent complaint was the dry mouth and urination at night. Stated all of this occurred before the metformin.   Current diabetes symptoms: polyuria and polydipsia. Also notes neuropathy in his feet. Noted some blurry vision today as well.  Diet/Exercise: Mornings: waffle for breakfast  with butter, at break, usually has yogurt or boiled egg. For lunch eats chicken and vegetables. Dinner is usually a protein with vegetables.      Blood sugar monitoring: Dexcom G7      Generated at: Mon, Apr 14, 2025 9:32 AM CDT    Reporting period: Tue Apr 1, 2025 - Mon Apr 14,  2025  -----------------------------  Glucose Details    Average glucose: 207 mg/dL    GMI: N/A    Standard deviation: 49 mg/dL    Coefficient of Variation: 23.5%  -----------------------------  Time in Range    Very High: 20%    High: 43%    In Range: 37%    Low: 0%    Very Low: 0%    Target Range   mg/dL  -----------------------------  Sensor usage    Days with data: 3/14    Time active: 71%    Avg. calibrations per day: 0.0    Eye exam in the last 12 months? Up to date  Foot exam: up to date  Last A1c: 12.3% (03/17/2025)      Today's Vitals: /62 (BP Location: Right arm)   ----------------      I spent 25 minutes with this patient today. All changes were made via collaborative practice agreement with Angel Corado MD.     A summary of these recommendations was given to the patient.    Pocne Thorpe, PharmD, CE, BCACP  Medication Therapy Management Pharmacist        Medication Therapy Recommendations  Type 2 diabetes mellitus without complication, with long-term current use of insulin (H)   1 Current Medication: insulin glargine (LANTUS PEN) 100 UNIT/ML pen   Current Medication Sig: Inject 12 Units subcutaneously at bedtime.   Rationale: Dose too low - Dosage too low - Effectiveness   Recommendation: Increase Dose   Status: Accepted per CPA   Identified Date: 4/14/2025 Completed Date: 4/14/2025

## 2025-05-20 ENCOUNTER — OFFICE VISIT (OUTPATIENT)
Dept: PHARMACY | Facility: PHYSICIAN GROUP | Age: 60
End: 2025-05-20
Payer: COMMERCIAL

## 2025-05-20 VITALS — SYSTOLIC BLOOD PRESSURE: 126 MMHG | HEART RATE: 64 BPM | OXYGEN SATURATION: 97 % | DIASTOLIC BLOOD PRESSURE: 70 MMHG

## 2025-05-20 DIAGNOSIS — E11.9 TYPE 2 DIABETES MELLITUS WITHOUT COMPLICATION, WITH LONG-TERM CURRENT USE OF INSULIN (H): Primary | ICD-10-CM

## 2025-05-20 DIAGNOSIS — Z79.4 TYPE 2 DIABETES MELLITUS WITHOUT COMPLICATION, WITH LONG-TERM CURRENT USE OF INSULIN (H): Primary | ICD-10-CM

## 2025-05-20 PROCEDURE — 99607 MTMS BY PHARM ADDL 15 MIN: CPT | Performed by: PHARMACIST

## 2025-05-20 PROCEDURE — 99606 MTMS BY PHARM EST 15 MIN: CPT | Performed by: PHARMACIST

## 2025-05-20 PROCEDURE — 3074F SYST BP LT 130 MM HG: CPT | Performed by: PHARMACIST

## 2025-05-20 PROCEDURE — 3078F DIAST BP <80 MM HG: CPT | Performed by: PHARMACIST

## 2025-05-20 NOTE — PATIENT INSTRUCTIONS
"Recommendations from today's MTM visit:                                                         Decrease Lantus to 16 units daily    Continue Mounjaro 5 mg until you have completed four doses, then increase to 7.5 mg weekly     Reach out to your surgery team and see if they want you to hold your Mounjaro dose. Typically, they will have you stop the medication a week prior to surgery. Since you take your medication on Mondays, they may ask you to skip this next dose    Follow-up: Return in 4 weeks (on 6/17/2025) for Follow up, using a phone visit @ 8:00 AM.    It was great speaking with you today.  I value your experience and would be very thankful for your time in providing feedback in our clinic survey. In the next few days, you may receive an email or text message from [facility] with a link to a survey related to your  clinical pharmacist.\"     To schedule another MTM appointment, please call the clinic directly or you may call the MTM scheduling line at [phone].    My Clinical Pharmacist's contact information:                                                      Please feel free to contact me with any questions or concerns you have.      Ponce Thorpe, PharmD, CE, BCACP  Medication Therapy Management Pharmacist     "

## 2025-05-20 NOTE — PROGRESS NOTES
Medication Therapy Management (MTM) Encounter    ASSESSMENT:                            Medication Adherence/Access: No issues identified.    Diabetes  Patient is not meeting A1c goal of < 7%.  Patient is meeting goal of > 70% time in target with continuous glucose monitoring.    Reasonable to decrease insulin and continue titration of Mounjaro  For his surgery, likely that they will want him to hold the Mounjaro dose prior    PLAN:                            Decrease Lantus to 16 units daily    Continue Mounjaro 5 mg until you have completed four doses, then increase to 7.5 mg weekly     Reach out to your surgery team and see if they want you to hold your Mounjaro dose. Typically, they will have you stop the medication a week prior to surgery. Since you take your medication on Mondays, they may ask you to skip this next dose    Follow-up: Return in 4 weeks (on 6/17/2025) for Follow up, using a phone visit @ 8:00 AM.    Ponce Thorpe, PharmD, CE, BCACP  Medication Therapy Management Pharmacist     SUBJECTIVE/OBJECTIVE:                          Richard Tracy is a 60 year old male seen for a follow-up visit.       Reason for visit: Diabetes follow up.    Allergies/ADRs: Reviewed in chart  Past Medical History: Reviewed in chart  Tobacco: He reports that he has quit smoking. He has never used smokeless tobacco.  Alcohol: Less than 1 beverages / week    Medication Adherence/Access: no issues reported. Patient states he has a surgery next week and was not told what to do with his Mounjaro dose    Diabetes   Lantus 20 units nightly  Mounjaro 5 mg weekly  - will be taking his third injection tonight.   Metformin 500 mg - 2 tablets twice daily   Aspirin 81mg daily  Patient is not experiencing side effects.  Also noted increased thirst and urination at night. Notably, he was sent in the immediate release tablet. Most prominent complaint was the dry mouth and urination at night. Stated all of this occurred before the  metformin.   Current diabetes symptoms: polyuria and polydipsia. Also notes neuropathy in his feet. Noted some blurry vision today as well.  Diet/Exercise: Mornings: waffle for breakfast  with butter, at break, usually has yogurt or boiled egg. For lunch eats chicken and vegetables. Dinner is usually a protein with vegetables.      Blood sugar monitoring: Dexcom G7      Generated at: Tue, May 20, 2025 8:04 AM CDT    Reporting period: Wed May 7, 2025 - Tue May 20, 2025  -----------------------------  Glucose Details    Average glucose: 109 mg/dL    GMI: 5.9%    Standard deviation: 24 mg/dL    Coefficient of Variation: 22.0%  -----------------------------  Time in Range    Very High: 0%    High: 2%    In Range: 97%    Low: 1%    Very Low: 0%    Target Range   mg/dL    -----------------------------  Sensor usage    Days with data: 13/14    Time active: 95%    Avg. calibrations per day: 0.0      Eye exam in the last 12 months? Up to date  Foot exam: up to date  Last A1c: 8.7% (05/09/2025)  Previous: 12.3% (03/17/2025)      Today's Vitals: /70   Pulse 64   SpO2 97%   ----------------      I spent 26 minutes with this patient today. All changes were made via collaborative practice agreement with Angel Corado MD.     A summary of these recommendations was given to the patient.    Ponce Thorpe, Michael, CE, BCACP  Medication Therapy Management Pharmacist        Medication Therapy Recommendations  Type 2 diabetes mellitus without complication, with long-term current use of insulin (H)   1 Current Medication: insulin glargine (LANTUS PEN) 100 UNIT/ML pen   Current Medication Sig: Inject 12 Units subcutaneously at bedtime.   Rationale: Dose too high - Dosage too high - Safety   Recommendation: Decrease Dose   Status: Accepted per CPA   Identified Date: 5/20/2025 Completed Date: 5/20/2025         2 Current Medication: tirzepatide (MOUNJARO) 2.5 MG/0.5ML SOAJ auto-injector pen (Discontinued)   Current  Medication Sig: Inject 2.5 mg subcutaneously once a week.   Rationale: Dose too low - Dosage too low - Effectiveness   Recommendation: Increase Dose   Status: Accepted per CPA   Identified Date: 5/20/2025 Completed Date: 5/20/2025

## 2025-06-08 ENCOUNTER — HEALTH MAINTENANCE LETTER (OUTPATIENT)
Age: 60
End: 2025-06-08

## 2025-06-17 ENCOUNTER — VIRTUAL VISIT (OUTPATIENT)
Dept: PHARMACY | Facility: PHYSICIAN GROUP | Age: 60
End: 2025-06-17
Payer: COMMERCIAL

## 2025-06-17 DIAGNOSIS — Z79.4 TYPE 2 DIABETES MELLITUS WITHOUT COMPLICATION, WITH LONG-TERM CURRENT USE OF INSULIN (H): Primary | ICD-10-CM

## 2025-06-17 DIAGNOSIS — E11.9 TYPE 2 DIABETES MELLITUS WITHOUT COMPLICATION, WITH LONG-TERM CURRENT USE OF INSULIN (H): Primary | ICD-10-CM

## 2025-06-17 PROCEDURE — 99606 MTMS BY PHARM EST 15 MIN: CPT | Mod: 93 | Performed by: PHARMACIST

## 2025-06-17 NOTE — PROGRESS NOTES
Medication Therapy Management (MTM) Encounter    ASSESSMENT:                            Medication Adherence/Access: No issues identified.    Diabetes  Patient is not meeting A1c goal of < 7%.  Patient is meeting goal of > 70% time in target with continuous glucose monitoring.    Reasonable to decrease insulin as patient was having occasional overnight lows. Mounjaro dose seems appropriate for now given glucose report    PLAN:                            Decrease Lantus to 10 units daily    Follow-up: Return in 4 weeks (on 7/15/2025) for Follow up, using a phone visit @ 9:00 AM.    Ponce Thorpe PharmD, CE, BCACP  Medication Therapy Management Pharmacist     SUBJECTIVE/OBJECTIVE:                          Richard Tracy is a 60 year old male seen for a follow-up visit.       Reason for visit: Diabetes follow up.    Allergies/ADRs: Reviewed in chart  Past Medical History: Reviewed in chart  Tobacco: He reports that he has quit smoking. He has never used smokeless tobacco.  Alcohol: Less than 1 beverages / week    Medication Adherence/Access: no issues reported. Patient states he has a surgery next week and was not told what to do with his Mounjaro dose    Diabetes   Lantus 16 units nightly  Mounjaro 7.5 mg weekly  - tolerating well. No issues reported.   Metformin 500 mg - 2 tablets twice daily   Aspirin 81mg daily  Patient is not experiencing side effects.  Current diabetes symptoms: None  Diet/Exercise: Mornings: waffle for breakfast  with butter, at break, usually has yogurt or boiled egg. For lunch eats chicken and vegetables. Dinner is usually a protein with vegetables.      Blood sugar monitoring: Dexcom G7    -----------------------------  Dexcom Clarity  -----------------------------    Generated at: Tue, Jun 17, 2025 7:59 AM CDT    Reporting period: Wed Jun 4, 2025 - Tue Jun 17, 2025  -----------------------------  Glucose Details    Average glucose: 106 mg/dL    GMI: 5.8%    Standard deviation: 22 mg/dL     Coefficient of Variation: 20.4%  -----------------------------  Time in Range    Very High: 0%    High: 1%    In Range: 99%    Low: 0%    Very Low: <1%    Target Range   mg/dL    -----------------------------  Sensor usage    Days with data: 13/14    Time active: 95%    Avg. calibrations per day: 0.0        Eye exam in the last 12 months? Up to date  Foot exam: up to date  Last A1c: 8.7% (05/09/2025)  Previous: 12.3% (03/17/2025)      Today's Vitals: There were no vitals taken for this visit.  ----------------      I spent 2 minutes with this patient today. All changes were made via collaborative practice agreement with Angel Corado MD.     A summary of these recommendations was given to the patient.    Marilyn MullenD, CE, BCACP  Medication Therapy Management Pharmacist         Telemedicine Visit Details  The patient's medications can be safely assessed via a telemedicine encounter.  Type of service:  Telephone visit  Originating Location (pt. Location): Home  Distant Location (provider location):  On-site  Start Time: 8:07 AM  End Time: 8:09 AM     Medication Therapy Recommendations  Type 2 diabetes mellitus without complication, with long-term current use of insulin (H)   1 Current Medication: insulin glargine (LANTUS PEN) 100 UNIT/ML pen   Current Medication Sig: Inject 12 Units subcutaneously at bedtime.   Rationale: Dose too high - Dosage too high - Safety   Recommendation: Decrease Dose   Status: Accepted per CPA   Identified Date: 6/17/2025 Completed Date: 6/17/2025

## 2025-06-30 ENCOUNTER — TRANSFERRED RECORDS (OUTPATIENT)
Dept: HEALTH INFORMATION MANAGEMENT | Facility: CLINIC | Age: 60
End: 2025-06-30
Payer: COMMERCIAL

## 2025-06-30 ENCOUNTER — LAB REQUISITION (OUTPATIENT)
Dept: LAB | Facility: CLINIC | Age: 60
End: 2025-06-30

## 2025-06-30 DIAGNOSIS — N40.1 BENIGN PROSTATIC HYPERPLASIA WITH LOWER URINARY TRACT SYMPTOMS: ICD-10-CM

## 2025-06-30 DIAGNOSIS — K76.0 FATTY (CHANGE OF) LIVER, NOT ELSEWHERE CLASSIFIED: ICD-10-CM

## 2025-06-30 DIAGNOSIS — E11.65 TYPE 2 DIABETES MELLITUS WITH HYPERGLYCEMIA (H): ICD-10-CM

## 2025-06-30 DIAGNOSIS — E78.5 HYPERLIPIDEMIA, UNSPECIFIED: ICD-10-CM

## 2025-06-30 LAB
ALBUMIN SERPL BCG-MCNC: 4.3 G/DL (ref 3.5–5.2)
ALP SERPL-CCNC: 58 U/L (ref 40–150)
ALT SERPL W P-5'-P-CCNC: 28 U/L (ref 0–70)
ANION GAP SERPL CALCULATED.3IONS-SCNC: 14 MMOL/L (ref 7–15)
AST SERPL W P-5'-P-CCNC: 35 U/L (ref 0–45)
BILIRUB SERPL-MCNC: 0.6 MG/DL
BUN SERPL-MCNC: 20.4 MG/DL (ref 8–23)
CALCIUM SERPL-MCNC: 9.9 MG/DL (ref 8.8–10.4)
CHLORIDE SERPL-SCNC: 105 MMOL/L (ref 98–107)
CHOLEST SERPL-MCNC: 98 MG/DL
CREAT SERPL-MCNC: 1.2 MG/DL (ref 0.67–1.17)
CREAT UR-MCNC: 189 MG/DL
EGFRCR SERPLBLD CKD-EPI 2021: 69 ML/MIN/1.73M2
FASTING STATUS PATIENT QL REPORTED: ABNORMAL
FASTING STATUS PATIENT QL REPORTED: ABNORMAL
GLUCOSE SERPL-MCNC: 94 MG/DL (ref 70–99)
HBA1C MFR BLD: 5.3 % (ref 4.2–6.1)
HCO3 SERPL-SCNC: 22 MMOL/L (ref 22–29)
HDLC SERPL-MCNC: 29 MG/DL
LDLC SERPL CALC-MCNC: 31 MG/DL
MICROALBUMIN UR-MCNC: 15.5 MG/L
MICROALBUMIN/CREAT UR: 8.2 MG/G CR (ref 0–17)
NONHDLC SERPL-MCNC: 69 MG/DL
POTASSIUM SERPL-SCNC: 3.3 MMOL/L (ref 3.4–5.3)
PROT SERPL-MCNC: 6.8 G/DL (ref 6.4–8.3)
PSA SERPL DL<=0.01 NG/ML-MCNC: 0.71 NG/ML (ref 0–4.5)
SODIUM SERPL-SCNC: 141 MMOL/L (ref 135–145)
TRIGL SERPL-MCNC: 192 MG/DL

## 2025-06-30 PROCEDURE — 82570 ASSAY OF URINE CREATININE: CPT | Performed by: FAMILY MEDICINE

## 2025-06-30 PROCEDURE — G0103 PSA SCREENING: HCPCS | Performed by: FAMILY MEDICINE

## 2025-06-30 PROCEDURE — 80061 LIPID PANEL: CPT | Performed by: FAMILY MEDICINE

## 2025-06-30 PROCEDURE — 84295 ASSAY OF SERUM SODIUM: CPT | Performed by: FAMILY MEDICINE

## 2025-07-14 NOTE — PROGRESS NOTES
Medication Therapy Management (MTM) Encounter    ASSESSMENT:                            Medication Adherence/Access: No issues identified.    Diabetes  Patient is meeting A1c goal of < 7%.  Patient is meeting goal of > 70% time in target with continuous glucose monitoring.    Patient is still experiencing fairly significant hypoglycemia despite stopping insulin. Because the patient expresses a desire to continue losing weight, reasonable to increase Mounjaro. Will decrease metformin to assist in minimizing hypoglycemia. Likely will be able to discontinue metformin altogether given excellent A1c.       PLAN:                            Decrease metformin to 500 mg twice daily    Increase Mounjaro to 10 mg once weekly    Follow-up: Return in 4 weeks (on 8/12/2025) for Follow up, using a phone visit @ 11:00 AM.    Ponce Tohrpe, PharmD, CE, BCACP  Medication Therapy Management Pharmacist     SUBJECTIVE/OBJECTIVE:                          Richard Tracy is a 60 year old male seen for a follow-up visit.       Reason for visit: Diabetes follow up.    Allergies/ADRs: Reviewed in chart  Past Medical History: Reviewed in chart  Tobacco: He reports that he has quit smoking. He has never used smokeless tobacco.  Alcohol: Less than 1 beverages / week    Medication Adherence/Access: no issues reported.     Diabetes   Mounjaro 7.5 mg weekly  - tolerating well. No issues reported. Expresses a desire to see continued weight loss.   Metformin 500 mg - 2 tablets twice daily   Aspirin 81mg daily  Patient is not experiencing side effects.  Previous medications:  Lantus - discontinued once A1c was at goal  Current diabetes symptoms: None  Diet/Exercise: Mornings: waffle for breakfast  with butter, at break, usually has yogurt or boiled egg. For lunch eats chicken and vegetables. Dinner is usually a protein with vegetables.      Blood sugar monitoring: Dexcom G7    -----------------------------  Dexcom  "Clarity  -----------------------------    Generated at: Mon, Jul 14, 2025 12:07 PM CDT    Reporting period: Tue Jul 1, 2025 - Mon Jul 14, 2025  -----------------------------  Glucose Details    Average glucose: 103 mg/dL    GMI: 5.8%    Standard deviation: 21 mg/dL    Coefficient of Variation: 20.0%  -----------------------------  Time in Range    Very High: 0%    High: 1%    In Range: 96%    Low: 2%    Very Low: 1%    Target Range   mg/dL  -----------------------------  Sensor usage    Days with data: 13/14    Time active: 96%    Avg. calibrations per day: 0.0      Eye exam in the last 12 months? Up to date  Foot exam: up to date  Last A1c: 5.3% (06/30/2025)  Previous:  8.7% (05/09/2025), 12.3% (03/17/2025)    Today's Vitals: /66 (BP Location: Right arm)   Pulse 66   Ht 5' 10\" (1.778 m)   Wt 252 lb (114.3 kg)   SpO2 97%   BMI 36.16 kg/m   - Virtual, vitals from last visit with PCP   ----------------      I spent 9 minutes with this patient today. All changes were made via collaborative practice agreement with Angel Corado MD.     A summary of these recommendations was given to the patient.    Ponce Thorpe PharmD, CE, BCACP  Medication Therapy Management Pharmacist         Telemedicine Visit Details  The patient's medications can be safely assessed via a telemedicine encounter.  Type of service:  Telephone visit  Originating Location (pt. Location): Home  Distant Location (provider location):  On-site  Start Time: 9:00 AM  End Time: 9:09 AM     Medication Therapy Recommendations  Type 2 diabetes mellitus without complication, with long-term current use of insulin (H)   1 Current Medication: Tirzepatide (MOUNJARO) 7.5 MG/0.5ML SOAJ auto-injector pen (Discontinued)   Current Medication Sig: Inject 7.5 mg subcutaneously once a week.   Rationale: Dose too low - Dosage too low - Effectiveness   Recommendation: Increase Dose - Mounjaro 10 MG/0.5ML Soaj   Status: Accepted per CPA   Identified " Date: 7/15/2025 Completed Date: 7/15/2025         2 Current Medication: metFORMIN (GLUCOPHAGE) 500 MG tablet   Current Medication Sig: Take 500 mg by mouth 2 times daily (with meals).   Rationale: Dose too high - Dosage too high - Safety   Recommendation: Decrease Dose   Status: Accepted per CPA   Identified Date: 7/15/2025 Completed Date: 7/15/2025

## 2025-07-15 ENCOUNTER — VIRTUAL VISIT (OUTPATIENT)
Dept: PHARMACY | Facility: PHYSICIAN GROUP | Age: 60
End: 2025-07-15
Payer: COMMERCIAL

## 2025-07-15 VITALS
OXYGEN SATURATION: 97 % | HEART RATE: 66 BPM | DIASTOLIC BLOOD PRESSURE: 66 MMHG | BODY MASS INDEX: 36.08 KG/M2 | HEIGHT: 70 IN | SYSTOLIC BLOOD PRESSURE: 125 MMHG | WEIGHT: 252 LBS

## 2025-07-15 DIAGNOSIS — E11.9 TYPE 2 DIABETES MELLITUS WITHOUT COMPLICATION, WITH LONG-TERM CURRENT USE OF INSULIN (H): Primary | ICD-10-CM

## 2025-07-15 DIAGNOSIS — Z79.4 TYPE 2 DIABETES MELLITUS WITHOUT COMPLICATION, WITH LONG-TERM CURRENT USE OF INSULIN (H): Primary | ICD-10-CM

## 2025-07-15 PROCEDURE — 3074F SYST BP LT 130 MM HG: CPT | Mod: 93 | Performed by: PHARMACIST

## 2025-07-15 PROCEDURE — 99606 MTMS BY PHARM EST 15 MIN: CPT | Mod: 93 | Performed by: PHARMACIST

## 2025-07-15 PROCEDURE — 3078F DIAST BP <80 MM HG: CPT | Mod: 93 | Performed by: PHARMACIST

## 2025-08-12 ENCOUNTER — VIRTUAL VISIT (OUTPATIENT)
Dept: PHARMACY | Facility: PHYSICIAN GROUP | Age: 60
End: 2025-08-12
Payer: COMMERCIAL

## 2025-08-12 DIAGNOSIS — E11.9 TYPE 2 DIABETES MELLITUS WITHOUT COMPLICATION, WITH LONG-TERM CURRENT USE OF INSULIN (H): Primary | ICD-10-CM

## 2025-08-12 DIAGNOSIS — Z79.4 TYPE 2 DIABETES MELLITUS WITHOUT COMPLICATION, WITH LONG-TERM CURRENT USE OF INSULIN (H): Primary | ICD-10-CM

## 2025-08-12 PROCEDURE — 99606 MTMS BY PHARM EST 15 MIN: CPT | Mod: 93 | Performed by: PHARMACIST
